# Patient Record
Sex: MALE | Race: BLACK OR AFRICAN AMERICAN | NOT HISPANIC OR LATINO
[De-identification: names, ages, dates, MRNs, and addresses within clinical notes are randomized per-mention and may not be internally consistent; named-entity substitution may affect disease eponyms.]

---

## 2018-06-18 ENCOUNTER — FORM ENCOUNTER (OUTPATIENT)
Age: 53
End: 2018-06-18

## 2018-06-19 ENCOUNTER — OUTPATIENT (OUTPATIENT)
Dept: OUTPATIENT SERVICES | Facility: HOSPITAL | Age: 53
LOS: 1 days | End: 2018-06-19
Payer: COMMERCIAL

## 2018-06-19 ENCOUNTER — APPOINTMENT (OUTPATIENT)
Dept: ORTHOPEDIC SURGERY | Facility: CLINIC | Age: 53
End: 2018-06-19
Payer: COMMERCIAL

## 2018-06-19 DIAGNOSIS — M17.12 UNILATERAL PRIMARY OSTEOARTHRITIS, LEFT KNEE: ICD-10-CM

## 2018-06-19 PROCEDURE — 20610 DRAIN/INJ JOINT/BURSA W/O US: CPT | Mod: LT

## 2018-06-19 PROCEDURE — 73564 X-RAY EXAM KNEE 4 OR MORE: CPT

## 2018-06-19 PROCEDURE — 99213 OFFICE O/P EST LOW 20 MIN: CPT | Mod: 25

## 2018-06-19 PROCEDURE — 73564 X-RAY EXAM KNEE 4 OR MORE: CPT | Mod: 26,50

## 2018-06-19 RX ADMIN — Medication 0 MG/2ML: at 00:00

## 2019-10-16 ENCOUNTER — TRANSCRIPTION ENCOUNTER (OUTPATIENT)
Age: 54
End: 2019-10-16

## 2019-10-16 ENCOUNTER — RESULT REVIEW (OUTPATIENT)
Age: 54
End: 2019-10-16

## 2019-12-03 ENCOUNTER — APPOINTMENT (OUTPATIENT)
Dept: UROLOGY | Facility: CLINIC | Age: 54
End: 2019-12-03

## 2019-12-10 ENCOUNTER — APPOINTMENT (OUTPATIENT)
Dept: UROLOGY | Facility: CLINIC | Age: 54
End: 2019-12-10

## 2023-01-01 ENCOUNTER — NON-APPOINTMENT (OUTPATIENT)
Age: 58
End: 2023-01-01

## 2023-03-28 ENCOUNTER — APPOINTMENT (OUTPATIENT)
Dept: OPHTHALMOLOGY | Facility: CLINIC | Age: 58
End: 2023-03-28

## 2024-07-28 ENCOUNTER — INPATIENT (INPATIENT)
Facility: HOSPITAL | Age: 59
LOS: 1 days | Discharge: HOME | DRG: 312 | End: 2024-07-30
Attending: INTERNAL MEDICINE | Admitting: INTERNAL MEDICINE
Payer: MEDICAID

## 2024-07-28 VITALS
HEART RATE: 84 BPM | OXYGEN SATURATION: 100 % | HEIGHT: 69 IN | DIASTOLIC BLOOD PRESSURE: 61 MMHG | TEMPERATURE: 98 F | RESPIRATION RATE: 16 BRPM | SYSTOLIC BLOOD PRESSURE: 105 MMHG | WEIGHT: 169.98 LBS

## 2024-07-28 DIAGNOSIS — N17.9 ACUTE KIDNEY FAILURE, UNSPECIFIED: ICD-10-CM

## 2024-07-28 DIAGNOSIS — R55 SYNCOPE AND COLLAPSE: ICD-10-CM

## 2024-07-28 DIAGNOSIS — I10 ESSENTIAL (PRIMARY) HYPERTENSION: ICD-10-CM

## 2024-07-28 DIAGNOSIS — E78.5 HYPERLIPIDEMIA, UNSPECIFIED: ICD-10-CM

## 2024-07-28 DIAGNOSIS — Z29.9 ENCOUNTER FOR PROPHYLACTIC MEASURES, UNSPECIFIED: ICD-10-CM

## 2024-07-28 DIAGNOSIS — Z96.652 PRESENCE OF LEFT ARTIFICIAL KNEE JOINT: Chronic | ICD-10-CM

## 2024-07-28 LAB
ADD ON TEST-SPECIMEN IN LAB: SIGNIFICANT CHANGE UP
ALBUMIN SERPL ELPH-MCNC: 5 G/DL — SIGNIFICANT CHANGE UP (ref 3.3–5)
ALP SERPL-CCNC: 49 U/L — SIGNIFICANT CHANGE UP (ref 40–120)
ALT FLD-CCNC: 28 U/L — SIGNIFICANT CHANGE UP (ref 10–45)
ANION GAP SERPL CALC-SCNC: 20 MMOL/L — HIGH (ref 5–17)
APPEARANCE UR: ABNORMAL
APTT BLD: 23.2 SEC — LOW (ref 24.5–35.6)
AST SERPL-CCNC: 29 U/L — SIGNIFICANT CHANGE UP (ref 10–40)
BACTERIA # UR AUTO: NEGATIVE /HPF — SIGNIFICANT CHANGE UP
BASOPHILS # BLD AUTO: 0.03 K/UL — SIGNIFICANT CHANGE UP (ref 0–0.2)
BASOPHILS NFR BLD AUTO: 0.2 % — SIGNIFICANT CHANGE UP (ref 0–2)
BILIRUB SERPL-MCNC: 1 MG/DL — SIGNIFICANT CHANGE UP (ref 0.2–1.2)
BILIRUB UR-MCNC: NEGATIVE — SIGNIFICANT CHANGE UP
BUN SERPL-MCNC: 21 MG/DL — SIGNIFICANT CHANGE UP (ref 7–23)
CALCIUM SERPL-MCNC: 10.6 MG/DL — HIGH (ref 8.4–10.5)
CAST: >63 /LPF — HIGH (ref 0–4)
CHLORIDE SERPL-SCNC: 101 MMOL/L — SIGNIFICANT CHANGE UP (ref 96–108)
CK SERPL-CCNC: 219 U/L — HIGH (ref 30–200)
CO2 SERPL-SCNC: 23 MMOL/L — SIGNIFICANT CHANGE UP (ref 22–31)
COARSE GRAN CASTS #/AREA URNS AUTO: PRESENT
COLOR SPEC: SIGNIFICANT CHANGE UP
CREAT SERPL-MCNC: 1.67 MG/DL — HIGH (ref 0.5–1.3)
D DIMER BLD IA.RAPID-MCNC: 200 NG/ML DDU — SIGNIFICANT CHANGE UP
DIFF PNL FLD: NEGATIVE — SIGNIFICANT CHANGE UP
EGFR: 47 ML/MIN/1.73M2 — LOW
EOSINOPHIL # BLD AUTO: 0.02 K/UL — SIGNIFICANT CHANGE UP (ref 0–0.5)
EOSINOPHIL NFR BLD AUTO: 0.2 % — SIGNIFICANT CHANGE UP (ref 0–6)
FINE GRAN CASTS #/AREA URNS AUTO: PRESENT
GLUCOSE SERPL-MCNC: 97 MG/DL — SIGNIFICANT CHANGE UP (ref 70–99)
GLUCOSE UR QL: NEGATIVE MG/DL — SIGNIFICANT CHANGE UP
HCT VFR BLD CALC: 41.7 % — SIGNIFICANT CHANGE UP (ref 39–50)
HGB BLD-MCNC: 13.5 G/DL — SIGNIFICANT CHANGE UP (ref 13–17)
IMM GRANULOCYTES NFR BLD AUTO: 0.6 % — SIGNIFICANT CHANGE UP (ref 0–0.9)
INR BLD: 1.14 RATIO — SIGNIFICANT CHANGE UP (ref 0.85–1.18)
KETONES UR-MCNC: 15 MG/DL
LEUKOCYTE ESTERASE UR-ACNC: ABNORMAL
LYMPHOCYTES # BLD AUTO: 0.55 K/UL — LOW (ref 1–3.3)
LYMPHOCYTES # BLD AUTO: 4.2 % — LOW (ref 13–44)
MCHC RBC-ENTMCNC: 31.7 PG — SIGNIFICANT CHANGE UP (ref 27–34)
MCHC RBC-ENTMCNC: 32.4 GM/DL — SIGNIFICANT CHANGE UP (ref 32–36)
MCV RBC AUTO: 97.9 FL — SIGNIFICANT CHANGE UP (ref 80–100)
MONOCYTES # BLD AUTO: 0.88 K/UL — SIGNIFICANT CHANGE UP (ref 0–0.9)
MONOCYTES NFR BLD AUTO: 6.7 % — SIGNIFICANT CHANGE UP (ref 2–14)
NEUTROPHILS # BLD AUTO: 11.66 K/UL — HIGH (ref 1.8–7.4)
NEUTROPHILS NFR BLD AUTO: 88.1 % — HIGH (ref 43–77)
NITRITE UR-MCNC: NEGATIVE — SIGNIFICANT CHANGE UP
NRBC # BLD: 0 /100 WBCS — SIGNIFICANT CHANGE UP (ref 0–0)
NT-PROBNP SERPL-SCNC: 132 PG/ML — SIGNIFICANT CHANGE UP (ref 0–300)
PH UR: 5.5 — SIGNIFICANT CHANGE UP (ref 5–8)
PLATELET # BLD AUTO: 214 K/UL — SIGNIFICANT CHANGE UP (ref 150–400)
POTASSIUM SERPL-MCNC: 4.4 MMOL/L — SIGNIFICANT CHANGE UP (ref 3.5–5.3)
POTASSIUM SERPL-SCNC: 4.4 MMOL/L — SIGNIFICANT CHANGE UP (ref 3.5–5.3)
PROT SERPL-MCNC: 7.6 G/DL — SIGNIFICANT CHANGE UP (ref 6–8.3)
PROT UR-MCNC: 100 MG/DL
PROTHROM AB SERPL-ACNC: 11.9 SEC — SIGNIFICANT CHANGE UP (ref 9.5–13)
RBC # BLD: 4.26 M/UL — SIGNIFICANT CHANGE UP (ref 4.2–5.8)
RBC # FLD: 11.9 % — SIGNIFICANT CHANGE UP (ref 10.3–14.5)
RBC CASTS # UR COMP ASSIST: 2 /HPF — SIGNIFICANT CHANGE UP (ref 0–4)
REVIEW: SIGNIFICANT CHANGE UP
SODIUM SERPL-SCNC: 144 MMOL/L — SIGNIFICANT CHANGE UP (ref 135–145)
SP GR SPEC: 1.02 — SIGNIFICANT CHANGE UP (ref 1–1.03)
SQUAMOUS # UR AUTO: 6 /HPF — HIGH (ref 0–5)
TROPONIN T, HIGH SENSITIVITY RESULT: 56 NG/L — HIGH (ref 0–51)
TROPONIN T, HIGH SENSITIVITY RESULT: 60 NG/L — HIGH (ref 0–51)
TROPONIN T, HIGH SENSITIVITY RESULT: 68 NG/L — HIGH (ref 0–51)
UROBILINOGEN FLD QL: 1 MG/DL — SIGNIFICANT CHANGE UP (ref 0.2–1)
WBC # BLD: 13.22 K/UL — HIGH (ref 3.8–10.5)
WBC # FLD AUTO: 13.22 K/UL — HIGH (ref 3.8–10.5)
WBC UR QL: 3 /HPF — SIGNIFICANT CHANGE UP (ref 0–5)

## 2024-07-28 PROCEDURE — 71045 X-RAY EXAM CHEST 1 VIEW: CPT | Mod: 26

## 2024-07-28 PROCEDURE — 99285 EMERGENCY DEPT VISIT HI MDM: CPT

## 2024-07-28 PROCEDURE — 99223 1ST HOSP IP/OBS HIGH 75: CPT

## 2024-07-28 RX ORDER — MAGNESIUM, ALUMINUM HYDROXIDE 200-225/5
30 SUSPENSION, ORAL (FINAL DOSE FORM) ORAL EVERY 4 HOURS
Refills: 0 | Status: DISCONTINUED | OUTPATIENT
Start: 2024-07-28 | End: 2024-07-30

## 2024-07-28 RX ORDER — ONDANSETRON HCL/PF 4 MG/2 ML
4 VIAL (ML) INJECTION EVERY 8 HOURS
Refills: 0 | Status: DISCONTINUED | OUTPATIENT
Start: 2024-07-28 | End: 2024-07-30

## 2024-07-28 RX ORDER — BACTERIOSTATIC SODIUM CHLORIDE 0.9 %
1000 VIAL (ML) INJECTION ONCE
Refills: 0 | Status: COMPLETED | OUTPATIENT
Start: 2024-07-28 | End: 2024-07-28

## 2024-07-28 RX ORDER — ATORVASTATIN CALCIUM 40 MG/1
20 TABLET, FILM COATED ORAL AT BEDTIME
Refills: 0 | Status: DISCONTINUED | OUTPATIENT
Start: 2024-07-28 | End: 2024-07-30

## 2024-07-28 RX ORDER — ACETAMINOPHEN 500 MG
650 TABLET ORAL EVERY 6 HOURS
Refills: 0 | Status: DISCONTINUED | OUTPATIENT
Start: 2024-07-28 | End: 2024-07-30

## 2024-07-28 RX ORDER — MELATONIN 3 MG
3 TABLET ORAL AT BEDTIME
Refills: 0 | Status: DISCONTINUED | OUTPATIENT
Start: 2024-07-28 | End: 2024-07-30

## 2024-07-28 RX ORDER — ATORVASTATIN CALCIUM 40 MG/1
1 TABLET, FILM COATED ORAL
Refills: 0 | DISCHARGE

## 2024-07-28 RX ORDER — AMLODIPINE BESYLATE 2.5 MG/1
5 TABLET ORAL DAILY
Refills: 0 | Status: DISCONTINUED | OUTPATIENT
Start: 2024-07-28 | End: 2024-07-30

## 2024-07-28 RX ORDER — AMLODIPINE BESYLATE/BENAZEPRIL 2.5MG-10MG
1 CAPSULE ORAL
Refills: 0 | DISCHARGE

## 2024-07-28 RX ADMIN — Medication 1000 MILLILITER(S): at 11:26

## 2024-07-28 NOTE — H&P ADULT - NSHPPHYSICALEXAM_GEN_ALL_CORE
Vital Signs Last 24 Hrs  T(C): 36.7 (28 Jul 2024 10:55), Max: 36.7 (28 Jul 2024 10:55)  T(F): 98 (28 Jul 2024 10:55), Max: 98 (28 Jul 2024 10:55)  HR: 82 (28 Jul 2024 10:55) (82 - 84)  BP: 132/75 (28 Jul 2024 10:55) (105/61 - 132/75)  BP(mean): 91 (28 Jul 2024 10:55) (91 - 91)  RR: 19 (28 Jul 2024 10:55) (16 - 19)  SpO2: 98% (28 Jul 2024 10:55) (98% - 100%)    Parameters below as of 28 Jul 2024 10:55  Patient On (Oxygen Delivery Method): room air        CONSTITUTIONAL: Well-groomed, in no apparent distress  NECK: Trachea midline   RESPIRATORY: Breathing comfortably; lungs CTA without wheeze/rhonchi/rales  CARDIOVASCULAR: +S1S2, RRR,  no lower extremity edema  GASTROINTESTINAL: No palpable masses or tenderness, +BS throughout  SKIN: No rashes or ulcers noted  NEUROLOGIC: Sensation intact in LEs b/l to light touch  PSYCHIATRIC: A+O x 3

## 2024-07-28 NOTE — ED PROVIDER NOTE - ATTENDING APP SHARED VISIT CONTRIBUTION OF CARE
I have personally performed a face to face medical and diagnostic evaluation of the patient. I have discussed with and reviewed the Resident's and/or ACP's and/or Medical/PA/NP student's note and agree with the History, ROS, Physical Exam and MDM unless otherwise indicated. A brief summary of my personal evaluation and impression can be found below.     58-year-old male past medical history hyperlipidemia presenting to the emergency department with a syncopal episode that occurred after biking 43 miles this morning, patient states he felt a little off after getting off his bike with some mild shortness of breath and does not remember the passing out episode.  Denies palpitations, dizziness, chest pain prior to episode.  On initial ED arrival, patient states he feels weak.  Otherwise no shortness of breath, chest pain, dizziness, headache.  Patient told he had LVH during a cardiac clearance for knee surgery 10 months ago.  Cardiac history in his family.  No recent travel or prolonged immobilization.  No new calf pain or swelling.  No history of blood clots.  Patient states he was pushing himself a little more than usual during this bike ride today.  Reports some shortness of breath with exertion today but otherwise no chest pain on exertion or shortness of breath with exertion in the past.    GENERAL: Awake. Alert. NAD. Well nourished.  HEENT: NC/AT, PERRL, EOMI, Conjunctiva pink, no scleral icterus. Airway patent. Dry mucous membranes.  LUNGS: CTAB. No wheezes or rales noted.  CARDIAC: Chest non-tender to palpation. RRR.  ABDOMEN: Soft, NT, ND, no rebound, no guarding.  EXT: No edema, no calf tenderness, distal pulses 2+ bilaterally  NEURO: A&Ox3. Moving all extremities. Sensation and strength intact throughout. No focal deficits.  SKIN: Warm and dry.   PSYCH: Normal affect.     Given hx and physical, ddx includes but is not limited to Vasovagal syncope, dehydration, rhabdo, anemia, patient with history of LVH, concern for possible HOCM or cardiogenic origin.   Lower concern for PE (no DVT signs,  no chest pain, patient with transient shortness of breath and mild shortness of breath on exertion today. However pt cannot by excluded for PERC due to age, will plan on obtaining d-dimer). Plan for EKG, x-ray, labs, urine, IV fluids, likely placed in observation unit for echo.    Tatiana Guzman DO (Attending):   EKG showing normal sinus rhythm with LVH, ventricular rate 77, CA interval 162, QRS 84, QTc 457, T wave inversions in lead III, aVF.
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
## 2024-07-28 NOTE — H&P ADULT - PROBLEM SELECTOR PLAN 2
- Pt denies dark urine, muscle aches, though recent significant exertion  - UA showing protein fine and coarse granular casts, ?ATN/Rhabdo  - CPK pending   - Creatinine 1.67, no known history of CKD  - Trend creatinine on BMP   - Received 1L IVF NS in ED, encourage oral intake  - Pt takes benazapril at home- holding   - Renally dose meds

## 2024-07-28 NOTE — H&P ADULT - NSHPLABSRESULTS_GEN_ALL_CORE
13.5   13.22 )-----------( 214      ( 28 Jul 2024 11:06 )             41.7       07-28    144  |  101  |  21  ----------------------------<  97  4.4   |  23  |  1.67<H>    Ca    10.6<H>      28 Jul 2024 11:06    TPro  7.6  /  Alb  5.0  /  TBili  1.0  /  DBili  x   /  AST  29  /  ALT  28  /  AlkPhos  49  07-28      PT/INR - ( 28 Jul 2024 11:55 )   PT: 11.9 sec;   INR: 1.14 ratio         PTT - ( 28 Jul 2024 11:55 )  PTT:23.2 sec    CARDIAC MARKERS ( 28 Jul 2024 11:52 )  x     / x     / 156 U/L / x     / x              Troponin T, High Sensitivity Result: 68 ng/L (07-28-24 @ 11:52)  Troponin T, High Sensitivity Result: 56 ng/L (07-28-24 @ 11:06)

## 2024-07-28 NOTE — PROGRESS NOTE ADULT - SUBJECTIVE AND OBJECTIVE BOX
DATE OF SERVICE: 07-28-24 @ 22:53    Patient is a 58y old  Male who presents with a chief complaint of syncope (28 Jul 2024 15:33)      INTERVAL HISTORY: feels ok    	  MEDICATIONS:  amLODIPine   Tablet 5 milliGRAM(s) Oral daily        PHYSICAL EXAM:  T(C): 37.3 (07-28-24 @ 18:00), Max: 37.3 (07-28-24 @ 18:00)  HR: 63 (07-28-24 @ 18:00) (63 - 84)  BP: 112/70 (07-28-24 @ 18:00) (105/61 - 136/81)  RR: 18 (07-28-24 @ 18:00) (16 - 20)  SpO2: 97% (07-28-24 @ 18:00) (97% - 100%)  Wt(kg): --  I&O's Summary    Height (cm): 175.3 (07-28 @ 10:43)  Weight (kg): 77.1 (07-28 @ 10:43)  BMI (kg/m2): 25.1 (07-28 @ 10:43)  BSA (m2): 1.93 (07-28 @ 10:43)    Appearance: In no distress	  HEENT:    PERRL, EOMI	  Cardiovascular:  S1 S2, No JVD  Respiratory: Lungs clear to auscultation	  Gastrointestinal:  Soft, Non-tender, + BS	  Vascularature:  No edema of LE  Psychiatric: Appropriate affect   Neuro: no acute focal deficits                               13.5   13.22 )-----------( 214      ( 28 Jul 2024 11:06 )             41.7     07-28    144  |  101  |  21  ----------------------------<  97  4.4   |  23  |  1.67<H>    Ca    10.6<H>      28 Jul 2024 11:06    TPro  7.6  /  Alb  5.0  /  TBili  1.0  /  DBili  x   /  AST  29  /  ALT  28  /  AlkPhos  49  07-28        Labs personally reviewed      Assessment:  · Assessment	  58 year old M with PMH of HTN, HLD presenting for episode of syncope. Pt bikes recreationally. Today he biked 40 miles with a biking partner. Pt being admitted for syncope and ILENE. Work up as below      Problem/Plan - 1:  ·  Problem: Syncope.   ·  Plan: - EKG reviewed showing NSR 77 Qtc 457 LVH  - TTE pending  - Nonspecific in setting of mild rhabdo 2/2 biking 43 miles   - Likely 2/2 heat exaustion from long bike ride in hot weather  - but given family hx of premature CVD will need ischemic eval  - Stress test can't be done within 48 hours of peak trop  - CTA cant be done until ILENE resolves  - Will obtain echo and reassess    Problem/Plan - 2:  ·  Problem: HTN (hypertension).   ·  Plan: - Continue with Amlodipine 5 mg qd with parameters  -Holding ACE given ILENE           Kenny Rosales DO [facility]  Cardiovascular Medicine  [address], [address]  Office: [phone]  Available via Text/call on Microsoft Teams
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
## 2024-07-28 NOTE — H&P ADULT - TIME BILLING
- Ordering, reviewing, and interpreting labs, testing, and imaging.  - Independently obtaining a review of systems and performing a physical exam  - Reviewing consultant documentation/recommendations in addition to discussing plan of care with consultants.  - Counselling and educating patient  regarding interpretation of aforementioned items and plan of care.
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
## 2024-07-28 NOTE — H&P ADULT - PROBLEM SELECTOR PLAN 1
- Admit to tele to monitor for ventricular arrythmia   - Cardiac work up done within the past year, girlfriend will bring records   - Orthostatic measurements pending   - EKG reviewed showing NSR 77 Qtc 457 LVH  - TTE pending  - Troponin 56, 68, repeat pending - Likely demand, trend to peak   - Leukocytosis 13.22 on admission, no fevers, will monitor off of abx at this time  - Patient has no urinary symptoms, trace leuk est on UA  - CXR showing no consolidation, has residual dry cough from URI 3 weeks ago  - Cardiology, Dr. Rosales following

## 2024-07-28 NOTE — ED PROVIDER NOTE - PROGRESS NOTE DETAILS
Ambreen Erickson PA-C: results reviewed. patient found to have +delta trop. patient with no chest pain at this time. spoke with Dr. Calloway. will admit to his service. states he will call cardiology. discussed with ED attending. Tatiana Guzman DO (Attending): Agree with above progress note.  Patient with exertional syncope in the setting of LVH on EKG.  Patient with delta Trop 56-68.  Patient without chest pain and currently hemodynamically stable, denies symptoms at this time with the exception of generalized weakness.  Patient to be admitted for further syncope workup.
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
## 2024-07-28 NOTE — H&P ADULT - NSHPREVIEWOFSYSTEMS_GEN_ALL_CORE
Review of Systems:   CONSTITUTIONAL: No fever, weight loss  EYES: No eye pain, visual disturbances, or discharge  ENMT:  No difficulty hearing, tinnitus, vertigo; No sinus or throat pain  RESPIRATORY: No SOB. No wheezing, chills or hemoptysis + cough  CARDIOVASCULAR: No chest pain, palpitations, dizziness, or leg swelling  GASTROINTESTINAL: No abdominal or epigastric pain. No nausea, vomiting, or hematemesis; No diarrhea or constipation. No melena or hematochezia.  GENITOURINARY: No dysuria, frequency, hematuria, or incontinence  NEUROLOGICAL: No headaches, memory loss, loss of strength, numbness, or tremors  SKIN: No itching, burning, rashes, or lesions   MUSCULOSKELETAL: No joint pain or swelling; No muscle, back pain

## 2024-07-28 NOTE — ED ADULT TRIAGE NOTE - PRO INTERPRETER NEED 2
English
Vomiting, Child  Vomiting occurs when stomach contents are thrown up and out of the mouth. Many children notice nausea before vomiting. Vomiting can make your child feel weak and cause dehydration. Dehydration can make your child tired and thirsty, cause your child to have a dry mouth, and decrease how often your child urinates. It is important to treat your child’s vomiting as told by your child’s health care provider.    Follow these instructions at home:  Follow instructions from your child's health care provider about how to care for your child at home.    Eating and drinking     Follow these recommendations as told by your child's health care provider:    Give your child an oral rehydration solution (ORS). This is a drink that is sold at pharmacies and retail stores.  Continue to breastfeed or bottle-feed your young child. Do this frequently, in small amounts. Gradually increase the amount. Do not give your infant extra water.  Encourage your child to eat soft foods in small amounts every 3–4 hours, if your child is eating solid food. Continue your child’s regular diet, but avoid spicy or fatty foods, such as french fries and pizza.  Encourage your child to drink clear fluids, such as water, low-calorie popsicles, and fruit juice that has water added (diluted fruit juice). Have your child drink small amounts of clear fluids slowly. Gradually increase the amount.  Avoid giving your child fluids that contain a lot of sugar or caffeine, such as sports drinks and soda.    General instructions     Make sure that you and your child wash your hands frequently with soap and water. If soap and water are not available, use hand . Make sure that everyone in your child's household washes their hands frequently.  Give over-the-counter and prescription medicines only as told by your child's health care provider.  Watch your child’s condition for any changes.  Keep all follow-up visits as told by your child's health care provider. This is important.  Contact a health care provider if:  Image  Your child has a fever.  Your child will not drink fluids or cannot keep fluids down.  Your child is light-headed or dizzy.  Your child has a headache.  Your child has muscle cramps.  Get help right away if:  You notice signs of dehydration in your child, such as:    No urine in 8–12 hours.  Cracked lips.  Not making tears while crying.  Dry mouth.  Sunken eyes.  Sleepiness.  Weakness.    Your child’s vomiting lasts more than 24 hours.  Your child’s vomit is bright red or looks like black coffee grounds.  Your child has stools that are bloody or black, or stools that look like tar.  Your child has a severe headache, a stiff neck, or both.  Your child has abdominal pain.  Your child has difficulty breathing or is breathing very quickly.  Your child’s heart is beating very quickly.  Your child feels cold and clammy.  Your child seems confused.  You are unable to wake up your child.  Your child has pain while urinating.  This information is not intended to replace advice given to you by your health care provider. Make sure you discuss any questions you have with your health care provider.     Vómitos, Vince  El vómito ocurre cuando el contenido del estómago se expulsa hacia arriba y hacia afuera de la boca. Muchos niños notan náuseas antes de vomitar. Los vómitos pueden hacer que castillo hijo se sienta débil y causar deshidratación. La deshidratación puede hacer que castillo hijo se sienta cansado y tenga sed, que tenga la boca seca y que disminuya la frecuencia con la que orina. Es importante tratar los vómitos de castillo hijo según lo indique el proveedor de atención médica de castillo hijo.    Siga estas instrucciones en casa:  Siga las instrucciones del proveedor de atención médica de castillo hijo sobre cómo cuidar a castillo hijo en casa.    Comiendo y bebiendo    Siga estas recomendaciones según lo indicado por el proveedor de atención médica de castillo hijo:  Wily a castillo hijo cori solución de rehidratación oral (SRO). Esta es cori bebida que se vende en farmacias y tiendas minoristas.  Continúe amamantando o alimentando con biberón a castillo hijo pequeño. Jose Elias esto con frecuencia, en pequeñas cantidades. Aumente gradualmente la cantidad. No le dé a castillo bebé agua adicional.  Anime a castillo hijo a comer alimentos blandos en pequeñas cantidades cada 3 a 4 horas, si está comiendo alimentos sólidos. Continúe con la dieta habitual de castillo hijo, tiffani evite los alimentos picantes o grasosos, ferdinand las pedro pablo fritas y la pizza.  Anime a castillo hijo a beber líquidos kaitlynn, ferdinand agua, paletas heladas bajas en calorías y jugo de frutas con agua añadida (jugo de frutas diluido). Jose Elias que castillo hijo veena lentamente pequeñas cantidades de líquidos kaitlynn. Aumente gradualmente la cantidad.  Evite darle a castillo hijo líquidos que contengan mucha azúcar o cafeína, ferdinand bebidas deportivas y gaseosas.    Instrucciones generales    Asegúrese de que usted y castillo hijo se laven las kj con frecuencia con agua y jabón. Si no hay agua y jabón disponibles, use desinfectante para kj. Asegúrese de que todos en la casa de castillo hijo se laven las kj con frecuencia.  Administre medicamentos recetados y de venta marc solo según lo indique el proveedor de atención médica de castillo hijo.  Vigile la condición de castillo hijo para anthony si hay cambios.  Asista a todas las visitas de seguimiento según lo indique el proveedor de atención médica de castillo hijo. [place] es importante.  Comuníquese con un proveedor de atención médica si:  Imagen  Castillo hijo tiene fiebre.  Castillo hijo no beberá líquidos o no podrá retener los líquidos.  Castillo hijo está mareado o mareado.  Castillo hijo tiene dolor de bryon.  Castillo hijo tiene calambres musculares.  Obtenga ayuda de inmediato si:  Nota signos de deshidratación en castillo hijo, ferdinand:    No orina en 8 a 12 horas.  Labios agrietados.  No hacer lágrimas al llorar.  Boca seca.  Ojos hundidos.  Somnolencia.  Debilidad.    El vómito de castillo hijo dura más de 24 horas.  El vómito de castillo hijo es de color donato brillante o parece café molido.  Castillo hijo tiene heces con phong o negras, o heces que parecen alquitrán.  Castillo hijo tiene un oksana dolor de bryon, rigidez en el nayan o ambos.  Castillo hijo tiene dolor abdominal.  Castillo hijo tiene dificultad para respirar o está respirando muy rápido.  El corazón de castillo hijo está latiendo muy rápido.  Castillo hijo se siente frío y húmedo.  Castillo hijo parece confundido.  No puede despertar a castillo hijo.  Castillo hijo tiene dolor al orinar.  Esta información no pretende reemplazar los consejos que le brinde castillo proveedor de atención médica. Asegúrese de discutir cualquier pregunta que tenga con castillo proveedor de atención médica.

## 2024-07-28 NOTE — ED ADULT NURSE NOTE - NSFALLUNIVINTERV_ED_ALL_ED
Bed/Stretcher in lowest position, wheels locked, appropriate side rails in place/Call bell, personal items and telephone in reach/Instruct patient to call for assistance before getting out of bed/chair/stretcher/Non-slip footwear applied when patient is off stretcher/[place] to call system/Physically safe environment - no spills, clutter or unnecessary equipment/Purposeful proactive rounding/Room/bathroom lighting operational, light cord in reach

## 2024-07-28 NOTE — H&P ADULT - NSICDXFAMILYHX_GEN_ALL_CORE_FT
FAMILY HISTORY:  Father  Still living? Unknown  FH: prostate cancer, Age at diagnosis: Age Unknown    Mother  Still living? Unknown  FH: aortic aneurysm, Age at diagnosis: Age Unknown

## 2024-07-28 NOTE — ED ADULT NURSE REASSESSMENT NOTE - NS ED NURSE REASSESS COMMENT FT1
Confirmed with Christiano in the [facility] room that PT is receiving continuous cardiac monitoring and continuous pulse oximetry monitoring and to notify RN of oxygen saturations of less than 92%.

## 2024-07-29 ENCOUNTER — RESULT REVIEW (OUTPATIENT)
Age: 59
End: 2024-07-29

## 2024-07-29 LAB
ALBUMIN SERPL ELPH-MCNC: 4.5 G/DL — SIGNIFICANT CHANGE UP (ref 3.3–5)
ALP SERPL-CCNC: 42 U/L — SIGNIFICANT CHANGE UP (ref 40–120)
ALT FLD-CCNC: 27 U/L — SIGNIFICANT CHANGE UP (ref 10–45)
ANION GAP SERPL CALC-SCNC: 14 MMOL/L — SIGNIFICANT CHANGE UP (ref 5–17)
AST SERPL-CCNC: 32 U/L — SIGNIFICANT CHANGE UP (ref 10–40)
BASOPHILS # BLD AUTO: 0.01 K/UL — SIGNIFICANT CHANGE UP (ref 0–0.2)
BASOPHILS NFR BLD AUTO: 0.2 % — SIGNIFICANT CHANGE UP (ref 0–2)
BILIRUB SERPL-MCNC: 0.9 MG/DL — SIGNIFICANT CHANGE UP (ref 0.2–1.2)
BUN SERPL-MCNC: 24 MG/DL — HIGH (ref 7–23)
CALCIUM SERPL-MCNC: 9.8 MG/DL — SIGNIFICANT CHANGE UP (ref 8.4–10.5)
CHLORIDE SERPL-SCNC: 104 MMOL/L — SIGNIFICANT CHANGE UP (ref 96–108)
CHOLEST SERPL-MCNC: 214 MG/DL — HIGH
CK SERPL-CCNC: 297 U/L — HIGH (ref 30–200)
CO2 SERPL-SCNC: 26 MMOL/L — SIGNIFICANT CHANGE UP (ref 22–31)
CREAT SERPL-MCNC: 1.24 MG/DL — SIGNIFICANT CHANGE UP (ref 0.5–1.3)
CULTURE RESULTS: SIGNIFICANT CHANGE UP
EGFR: 67 ML/MIN/1.73M2 — SIGNIFICANT CHANGE UP
EOSINOPHIL # BLD AUTO: 0.06 K/UL — SIGNIFICANT CHANGE UP (ref 0–0.5)
EOSINOPHIL NFR BLD AUTO: 1.1 % — SIGNIFICANT CHANGE UP (ref 0–6)
GLUCOSE SERPL-MCNC: 86 MG/DL — SIGNIFICANT CHANGE UP (ref 70–99)
HCT VFR BLD CALC: 38.3 % — LOW (ref 39–50)
HDLC SERPL-MCNC: 95 MG/DL — SIGNIFICANT CHANGE UP
HGB BLD-MCNC: 12.9 G/DL — LOW (ref 13–17)
IMM GRANULOCYTES NFR BLD AUTO: 0.4 % — SIGNIFICANT CHANGE UP (ref 0–0.9)
LIPID PNL WITH DIRECT LDL SERPL: 111 MG/DL — HIGH
LYMPHOCYTES # BLD AUTO: 0.85 K/UL — LOW (ref 1–3.3)
LYMPHOCYTES # BLD AUTO: 16.1 % — SIGNIFICANT CHANGE UP (ref 13–44)
MCHC RBC-ENTMCNC: 32.7 PG — SIGNIFICANT CHANGE UP (ref 27–34)
MCHC RBC-ENTMCNC: 33.7 GM/DL — SIGNIFICANT CHANGE UP (ref 32–36)
MCV RBC AUTO: 97 FL — SIGNIFICANT CHANGE UP (ref 80–100)
MONOCYTES # BLD AUTO: 0.65 K/UL — SIGNIFICANT CHANGE UP (ref 0–0.9)
MONOCYTES NFR BLD AUTO: 12.3 % — SIGNIFICANT CHANGE UP (ref 2–14)
NEUTROPHILS # BLD AUTO: 3.68 K/UL — SIGNIFICANT CHANGE UP (ref 1.8–7.4)
NEUTROPHILS NFR BLD AUTO: 69.9 % — SIGNIFICANT CHANGE UP (ref 43–77)
NON HDL CHOLESTEROL: 119 MG/DL — SIGNIFICANT CHANGE UP
NRBC # BLD: 0 /100 WBCS — SIGNIFICANT CHANGE UP (ref 0–0)
PLATELET # BLD AUTO: 195 K/UL — SIGNIFICANT CHANGE UP (ref 150–400)
POTASSIUM SERPL-MCNC: 4.2 MMOL/L — SIGNIFICANT CHANGE UP (ref 3.5–5.3)
POTASSIUM SERPL-SCNC: 4.2 MMOL/L — SIGNIFICANT CHANGE UP (ref 3.5–5.3)
PROT SERPL-MCNC: 6.9 G/DL — SIGNIFICANT CHANGE UP (ref 6–8.3)
RBC # BLD: 3.95 M/UL — LOW (ref 4.2–5.8)
RBC # FLD: 12 % — SIGNIFICANT CHANGE UP (ref 10.3–14.5)
SODIUM SERPL-SCNC: 144 MMOL/L — SIGNIFICANT CHANGE UP (ref 135–145)
SPECIMEN SOURCE: SIGNIFICANT CHANGE UP
TRIGL SERPL-MCNC: 43 MG/DL — SIGNIFICANT CHANGE UP
WBC # BLD: 5.27 K/UL — SIGNIFICANT CHANGE UP (ref 3.8–10.5)
WBC # FLD AUTO: 5.27 K/UL — SIGNIFICANT CHANGE UP (ref 3.8–10.5)

## 2024-07-29 PROCEDURE — 93306 TTE W/DOPPLER COMPLETE: CPT | Mod: 26

## 2024-07-29 RX ADMIN — AMLODIPINE BESYLATE 5 MILLIGRAM(S): 2.5 TABLET ORAL at 04:57

## 2024-07-29 RX ADMIN — ATORVASTATIN CALCIUM 20 MILLIGRAM(S): 40 TABLET, FILM COATED ORAL at 21:11

## 2024-07-29 NOTE — PATIENT PROFILE ADULT - DO YOU FEEL LIKE HURTING YOURSELF OR OTHERS?
To [facility] with complaints of sore throat, headache, fever and nausea. States it started yesterday. States she does get strep and throat hurts like strep but normally doesn't get nauseated.       Pearl Fontanez RN  10/21/17 [id number] no

## 2024-07-29 NOTE — PROGRESS NOTE ADULT - ASSESSMENT
58 year old M with PMH of HTN, HLD presenting for episode of syncope. Pt bikes recreationally. Today he biked 40 miles with a biking partner. Pt being admitted for syncope and ILENE. Work up as below    Plan:    # Syncope:  - Nonspecific in setting of mild rhabdo 2/2 biking 43 miles   - Likely 2/2 heat exaustion from long bike ride in hot weather  - Trend [employer]  - Fall precautions  - Monitor on tele  - Echo pending  - Eventual Stress and CTA  - Cards following    # ILENE:  Improved  - Monitor I/O's  - Serial Cr  - Avoid nephrotoxins  - Renally dose medications  - Continue to monitor    # HTN/ HLD:  - C/w Cv meds    # GI:  - Bowel regimen prn    DVT ppx:  - IPC's    [employer]  [phone]

## 2024-07-29 NOTE — PROGRESS NOTE ADULT - SUBJECTIVE AND OBJECTIVE BOX
DATE OF SERVICE: 07-29-24 @ 22:45    Patient is a 58y old  Male who presents with a chief complaint of syncope (29 Jul 2024 10:10)      INTERVAL HISTORY: feels ok     	  MEDICATIONS:  amLODIPine   Tablet 5 milliGRAM(s) Oral daily        PHYSICAL EXAM:  T(C): 36.8 (07-29-24 @ 21:17), Max: 37.1 (07-29-24 @ 11:45)  HR: 54 (07-29-24 @ 21:17) (52 - 54)  BP: 124/66 (07-29-24 @ 21:17) (124/66 - 145/79)  RR: 18 (07-29-24 @ 21:17) (18 - 18)  SpO2: 96% (07-29-24 @ 21:17) (96% - 98%)  Wt(kg): --  I&O's Summary        Appearance: In no distress	  HEENT:    PERRL, EOMI	  Cardiovascular:  S1 S2, No JVD  Respiratory: Lungs clear to auscultation	  Gastrointestinal:  Soft, Non-tender, + BS	  Vascularature:  No edema of LE  Psychiatric: Appropriate affect   Neuro: no acute focal deficits                               12.9   5.27  )-----------( 195      ( 29 Jul 2024 07:14 )             38.3     07-29    144  |  104  |  24<H>  ----------------------------<  86  4.2   |  26  |  1.24    Ca    9.8      29 Jul 2024 07:14    TPro  6.9  /  Alb  4.5  /  TBili  0.9  /  DBili  x   /  AST  32  /  ALT  27  /  AlkPhos  42  07-29        Labs personally reviewed      Assessment:  · Assessment	  58 year old M with PMH of HTN, HLD presenting for episode of syncope. Pt bikes recreationally. Today he biked 40 miles with a biking partner. Pt being admitted for syncope and ILENE. Work up as below      Problem/Plan - 1:  ·  Problem: Syncope.   ·  Plan: - EKG reviewed showing NSR 77 Qtc 457 LVH  - Nonspecific in setting of mild rhabdo 2/2 biking 43 miles   - Likely 2/2 heat exaustion from long bike ride in hot weather with evidence of prerenal ILENE on admission   - but given family hx of premature CVD will need ischemic eval  - Stress test can't be done within 48 hours of peak trop  - CTA heart ordered  - echo unremarkable   - Rec OP event monitor to r/o occult arrythmia, discussed with t      OP follow up with [facility] cardiology Clinic [phone]         Kenny Rosales DO [facility]  Cardiovascular Medicine  [address] [place] [address], [address]  Office: [phone]  Available via Text/call on Microsoft Teams

## 2024-07-29 NOTE — PROGRESS NOTE ADULT - SUBJECTIVE AND OBJECTIVE BOX
SUBJECTIVE / OVERNIGHT EVENTS:    Patient seen and examined at bedside. No events noted overnight. Resting comfortably in bed      --------------------------------------------------------------------------------------------  LABS:                        12.9   5.27  )-----------( 195      ( 29 Jul 2024 07:14 )             38.3     07-29    144  |  104  |  24<H>  ----------------------------<  86  4.2   |  26  |  1.24    Ca    9.8      29 Jul 2024 07:14    TPro  6.9  /  Alb  4.5  /  TBili  0.9  /  DBili  x   /  AST  32  /  ALT  27  /  AlkPhos  42  07-29    PT/INR - ( 28 Jul 2024 11:55 )   PT: 11.9 sec;   INR: 1.14 ratio         PTT - ( 28 Jul 2024 11:55 )  PTT:23.2 sec  CAPILLARY BLOOD GLUCOSE        CARDIAC MARKERS ( 29 Jul 2024 07:14 )  x     / x     / 297 U/L / x     / x      CARDIAC MARKERS ( 28 Jul 2024 15:20 )  x     / x     / 219 U/L / x     / x      CARDIAC MARKERS ( 28 Jul 2024 11:52 )  x     / x     / 156 U/L / x     / x          Urinalysis Basic - ( 29 Jul 2024 07:14 )    Color: x / Appearance: x / SG: x / pH: x  Gluc: 86 mg/dL / Ketone: x  / Bili: x / Urobili: x   Blood: x / Protein: x / Nitrite: x   Leuk Esterase: x / RBC: x / WBC x   Sq Epi: x / Non Sq Epi: x / Bacteria: x        RADIOLOGY & ADDITIONAL TESTS: < from: Xray Chest 1 View- PORTABLE-Urgent (07.28.24 @ 11:12) >  IMPRESSION:  Clear lungs.    < end of copied text >      Imaging Personally Reviewed:  [x] YES  [ ] NO    Consultant(s) Notes Reviewed:  [x] YES  [ ] NO    MEDICATIONS  (STANDING):  amLODIPine   Tablet 5 milliGRAM(s) Oral daily  atorvastatin 20 milliGRAM(s) Oral at bedtime    MEDICATIONS  (PRN):  acetaminophen     Tablet .. 650 milliGRAM(s) Oral every 6 hours PRN Temp greater or equal to 38C (100.4F), Mild Pain (1 - 3)  aluminum hydroxide/magnesium hydroxide/simethicone Suspension 30 milliLiter(s) Oral every 4 hours PRN Dyspepsia  melatonin 3 milliGRAM(s) Oral at bedtime PRN Insomnia  ondansetron Injectable 4 milliGRAM(s) IV Push every 8 hours PRN Nausea and/or Vomiting      Care Discussed with Consultants/Other Providers [x] YES  [ ] NO    Vital Signs Last 24 Hrs  T(C): 36.9 (29 Jul 2024 08:02), Max: 37.3 (28 Jul 2024 18:00)  T(F): 98.4 (29 Jul 2024 08:02), Max: 99.2 (28 Jul 2024 18:00)  HR: 53 (29 Jul 2024 08:02) (52 - 84)  BP: 129/78 (29 Jul 2024 08:02) (105/61 - 136/81)  BP(mean): 91 (28 Jul 2024 10:55) (91 - 91)  RR: 18 (29 Jul 2024 08:02) (16 - 20)  SpO2: 98% (29 Jul 2024 08:02) (97% - 100%)    Parameters below as of 29 Jul 2024 08:02  Patient On (Oxygen Delivery Method): room air      I&O's Summary      PHYSICAL EXAM:  GENERAL: NAD, well-developed, comfortable  HEAD:  Atraumatic, Normocephalic  EYES: EOMI, PERRLA, conjunctiva and sclera clear  NECK: Supple, No JVD  CHEST/LUNG: Clear to auscultation bilaterally; No wheeze  HEART: Regular rate and rhythm; No murmurs, rubs, or gallops  ABDOMEN: Soft, Nontender, Nondistended; Bowel sounds present  NEURO: AAOx3, no focal weakness, 5/5 b/l extremity strength, b/l knee no arthritis, no effusion   EXTREMITIES:  2+ Peripheral Pulses, No clubbing, cyanosis, or edema  SKIN: No rashes or lesions

## 2024-07-30 ENCOUNTER — TRANSCRIPTION ENCOUNTER (OUTPATIENT)
Age: 59
End: 2024-07-30

## 2024-07-30 VITALS
HEART RATE: 53 BPM | OXYGEN SATURATION: 98 % | DIASTOLIC BLOOD PRESSURE: 76 MMHG | TEMPERATURE: 98 F | SYSTOLIC BLOOD PRESSURE: 135 MMHG | RESPIRATION RATE: 18 BRPM

## 2024-07-30 PROCEDURE — 75574 CT ANGIO HRT W/3D IMAGE: CPT | Mod: 26

## 2024-07-30 RX ADMIN — AMLODIPINE BESYLATE 5 MILLIGRAM(S): 2.5 TABLET ORAL at 06:32

## 2024-07-30 NOTE — DISCHARGE NOTE PROVIDER - NSDCFUADDAPPT_GEN_ALL_CORE_FT
APPTS ARE READY TO BE MADE: [x ] YES    Best Family or Patient Contact (if needed):    Additional Information about above appointments (if needed):    1: Cardiology clinic  2: Primary Care  3:     Other comments or requests:    APPTS ARE READY TO BE MADE: [x ] YES    Best Family or Patient Contact (if needed):    Additional Information about above appointments (if needed):    1: Cardiology clinic  2: Primary Care  3:     Other comments or requests:   Patient informed us they already have secured a follow up appointment which is not visible on [employer].

## 2024-07-30 NOTE — DISCHARGE NOTE NURSING/CASE MANAGEMENT/SOCIAL WORK - NSDCPEFALRISK_GEN_ALL_CORE
For information on Fall & Injury Prevention, visit: [email or web address] OR  [email or web address] OR  https://www.cdc.gov/steadi/patient.html

## 2024-07-30 NOTE — DISCHARGE NOTE PROVIDER - NSFOLLOWUPCLINICS_GEN_ALL_ED_FT
Cardiology at [facility]  Cardiology  [address] [place] [address]  Phone: [phone]  Fax:   Follow Up Time: 2 weeks    [facility] - Primary Care  Primary Care  [address] [place] [address] [place] [address]  Phone: [phone]  Fax:   Follow Up Time: 1 week

## 2024-07-30 NOTE — DISCHARGE NOTE NURSING/CASE MANAGEMENT/SOCIAL WORK - NSDCFUADDAPPT_GEN_ALL_CORE_FT
APPTS ARE READY TO BE MADE: [x ] YES    Best Family or Patient Contact (if needed):    Additional Information about above appointments (if needed):    1: Cardiology clinic  2: Primary Care  3:     Other comments or requests:

## 2024-07-30 NOTE — PROGRESS NOTE ADULT - SUBJECTIVE AND OBJECTIVE BOX
SUBJECTIVE / OVERNIGHT EVENTS:          --------------------------------------------------------------------------------------------  LABS:                        12.9   5.27  )-----------( 195      ( 29 Jul 2024 07:14 )             38.3     07-29    144  |  104  |  24<H>  ----------------------------<  86  4.2   |  26  |  1.24    Ca    9.8      29 Jul 2024 07:14    TPro  6.9  /  Alb  4.5  /  TBili  0.9  /  DBili  x   /  AST  32  /  ALT  27  /  AlkPhos  42  07-29    PT/INR - ( 28 Jul 2024 11:55 )   PT: 11.9 sec;   INR: 1.14 ratio         PTT - ( 28 Jul 2024 11:55 )  PTT:23.2 sec  CAPILLARY BLOOD GLUCOSE        CARDIAC MARKERS ( 29 Jul 2024 07:14 )  x     / x     / 297 U/L / x     / x      CARDIAC MARKERS ( 28 Jul 2024 15:20 )  x     / x     / 219 U/L / x     / x      CARDIAC MARKERS ( 28 Jul 2024 11:52 )  x     / x     / 156 U/L / x     / x          Urinalysis Basic - ( 29 Jul 2024 07:14 )    Color: x / Appearance: x / SG: x / pH: x  Gluc: 86 mg/dL / Ketone: x  / Bili: x / Urobili: x   Blood: x / Protein: x / Nitrite: x   Leuk Esterase: x / RBC: x / WBC x   Sq Epi: x / Non Sq Epi: x / Bacteria: x        RADIOLOGY & ADDITIONAL TESTS:    Imaging Personally Reviewed:  [x] YES  [ ] NO    Consultant(s) Notes Reviewed:  [x] YES  [ ] NO    MEDICATIONS  (STANDING):  amLODIPine   Tablet 5 milliGRAM(s) Oral daily  atorvastatin 20 milliGRAM(s) Oral at bedtime    MEDICATIONS  (PRN):  acetaminophen     Tablet .. 650 milliGRAM(s) Oral every 6 hours PRN Temp greater or equal to 38C (100.4F), Mild Pain (1 - 3)  aluminum hydroxide/magnesium hydroxide/simethicone Suspension 30 milliLiter(s) Oral every 4 hours PRN Dyspepsia  melatonin 3 milliGRAM(s) Oral at bedtime PRN Insomnia  ondansetron Injectable 4 milliGRAM(s) IV Push every 8 hours PRN Nausea and/or Vomiting      Care Discussed with Consultants/Other Providers [x] YES  [ ] NO    Vital Signs Last 24 Hrs  T(C): 36.7 (30 Jul 2024 04:22), Max: 37.1 (29 Jul 2024 11:45)  T(F): 98 (30 Jul 2024 04:22), Max: 98.7 (29 Jul 2024 11:45)  HR: 53 (30 Jul 2024 04:22) (52 - 54)  BP: 135/76 (30 Jul 2024 04:22) (124/66 - 145/79)  BP(mean): --  RR: 18 (30 Jul 2024 04:22) (18 - 18)  SpO2: 98% (30 Jul 2024 04:22) (96% - 98%)    Parameters below as of 30 Jul 2024 04:22  Patient On (Oxygen Delivery Method): room air      I&O's Summary    PHYSICAL EXAM:  GENERAL: NAD, well-developed, comfortable  HEAD:  Atraumatic, Normocephalic  EYES: EOMI, PERRLA, conjunctiva and sclera clear  NECK: Supple, No JVD  CHEST/LUNG: Clear to auscultation bilaterally; No wheeze  HEART: Regular rate and rhythm; No murmurs, rubs, or gallops  ABDOMEN: Soft, Nontender, Nondistended; Bowel sounds present  NEURO: AAOx3, no focal weakness, 5/5 b/l extremity strength, b/l knee no arthritis, no effusion   EXTREMITIES:  2+ Peripheral Pulses, No clubbing, cyanosis, or edema  SKIN: No rashes or lesions     SUBJECTIVE / OVERNIGHT EVENTS:    patient seen and examined  resting comfortably in bed  no events overnight  CTA pending  --------------------------------------------------------------------------------------------  LABS:                        12.9   5.27  )-----------( 195      ( 29 Jul 2024 07:14 )             38.3     07-29    144  |  104  |  24<H>  ----------------------------<  86  4.2   |  26  |  1.24    Ca    9.8      29 Jul 2024 07:14    TPro  6.9  /  Alb  4.5  /  TBili  0.9  /  DBili  x   /  AST  32  /  ALT  27  /  AlkPhos  42  07-29    PT/INR - ( 28 Jul 2024 11:55 )   PT: 11.9 sec;   INR: 1.14 ratio         PTT - ( 28 Jul 2024 11:55 )  PTT:23.2 sec  CAPILLARY BLOOD GLUCOSE        CARDIAC MARKERS ( 29 Jul 2024 07:14 )  x     / x     / 297 U/L / x     / x      CARDIAC MARKERS ( 28 Jul 2024 15:20 )  x     / x     / 219 U/L / x     / x      CARDIAC MARKERS ( 28 Jul 2024 11:52 )  x     / x     / 156 U/L / x     / x          Urinalysis Basic - ( 29 Jul 2024 07:14 )    Color: x / Appearance: x / SG: x / pH: x  Gluc: 86 mg/dL / Ketone: x  / Bili: x / Urobili: x   Blood: x / Protein: x / Nitrite: x   Leuk Esterase: x / RBC: x / WBC x   Sq Epi: x / Non Sq Epi: x / Bacteria: x        RADIOLOGY & ADDITIONAL TESTS:    Imaging Personally Reviewed:  [x] YES  [ ] NO    Consultant(s) Notes Reviewed:  [x] YES  [ ] NO    MEDICATIONS  (STANDING):  amLODIPine   Tablet 5 milliGRAM(s) Oral daily  atorvastatin 20 milliGRAM(s) Oral at bedtime    MEDICATIONS  (PRN):  acetaminophen     Tablet .. 650 milliGRAM(s) Oral every 6 hours PRN Temp greater or equal to 38C (100.4F), Mild Pain (1 - 3)  aluminum hydroxide/magnesium hydroxide/simethicone Suspension 30 milliLiter(s) Oral every 4 hours PRN Dyspepsia  melatonin 3 milliGRAM(s) Oral at bedtime PRN Insomnia  ondansetron Injectable 4 milliGRAM(s) IV Push every 8 hours PRN Nausea and/or Vomiting      Care Discussed with Consultants/Other Providers [x] YES  [ ] NO    Vital Signs Last 24 Hrs  T(C): 36.7 (30 Jul 2024 04:22), Max: 37.1 (29 Jul 2024 11:45)  T(F): 98 (30 Jul 2024 04:22), Max: 98.7 (29 Jul 2024 11:45)  HR: 53 (30 Jul 2024 04:22) (52 - 54)  BP: 135/76 (30 Jul 2024 04:22) (124/66 - 145/79)  BP(mean): --  RR: 18 (30 Jul 2024 04:22) (18 - 18)  SpO2: 98% (30 Jul 2024 04:22) (96% - 98%)    Parameters below as of 30 Jul 2024 04:22  Patient On (Oxygen Delivery Method): room air      I&O's Summary    PHYSICAL EXAM:  GENERAL: NAD, well-developed, comfortable  HEAD:  Atraumatic, Normocephalic  EYES: EOMI, PERRLA, conjunctiva and sclera clear  NECK: Supple, No JVD  CHEST/LUNG: Clear to auscultation bilaterally; No wheeze  HEART: Regular rate and rhythm; No murmurs, rubs, or gallops  ABDOMEN: Soft, Nontender, Nondistended; Bowel sounds present  NEURO: AAOx3, no focal weakness, 5/5 b/l extremity strength, b/l knee no arthritis, no effusion   EXTREMITIES:  2+ Peripheral Pulses, No clubbing, cyanosis, or edema  SKIN: No rashes or lesions

## 2024-07-30 NOTE — PROGRESS NOTE ADULT - ASSESSMENT
58 year old M with PMH of HTN, HLD presenting for episode of syncope. Pt bikes recreationally. Today he biked 40 miles with a biking partner. Pt being admitted for syncope and ILENE. Work up as below    Plan:    # Syncope:  - Nonspecific in setting of mild rhabdo 2/2 biking 43 miles   - Likely 2/2 heat exhaustion from long bike ride in hot weather  - [facility]  - Fall precautions  - Monitor on tele  - Stress test can't be done within 48 hours of peak trop  - Echo results reviewed  - CTA pending  - Cards following    # ILENE: Improved  - Monitor I/O's  - Serial Cr  - Avoid nephrotoxins  - Renally dose medications  - Continue to monitor    # HTN/ HLD:  - C/w CV meds    # GI:  - Bowel regimen prn    DVT ppx:  - IPC's    [facility]  [phone]   58 year old M with PMH of HTN, HLD presenting for episode of syncope. Pt bikes recreationally. Today he biked 40 miles with a biking partner. Pt being admitted for syncope and ILENE. Work up as below    Plan:    # Syncope:  - Nonspecific in setting of mild rhabdo 2/2 biking 43 miles   - Likely 2/2 heat exhaustion from long bike ride in hot weather  - [facility]  - Fall precautions  - Monitor on tele  - Stress test can't be done within 48 hours of peak trop  - Echo results reviewed  - CTA pending  - Cards following    # ILENE: Improved  - Monitor I/O's  - Serial Cr  - Avoid nephrotoxins  - Renally dose medications  - Continue to monitor    # HTN/ HLD:  - C/w CV meds    # GI:  - Bowel regimen prn    DVT ppx:  - IPC's    [facility]  [phone]

## 2024-07-30 NOTE — DISCHARGE NOTE PROVIDER - NSDCMRMEDTOKEN_GEN_ALL_CORE_FT
amlodipine-benazepril 5 mg-20 mg oral capsule: 1 cap(s) orally once a day  Lipitor 20 mg oral tablet: 1 tab(s) orally once a day (at bedtime)

## 2024-07-30 NOTE — DISCHARGE NOTE PROVIDER - CARE PROVIDER_API CALL
Abdoulaye Morales  [address] [address]  Phone: [phone]  Fax: [phone]  Follow Up Time: 1 week   no gum bleeding/no nose bleeding/no skin lumps

## 2024-07-30 NOTE — DISCHARGE NOTE PROVIDER - NSDCCPCAREPLAN_GEN_ALL_CORE_FT
PRINCIPAL DISCHARGE DIAGNOSIS  Diagnosis: Syncope  Assessment and Plan of Treatment: Ensure that you are drinking adequate water and avoiding exposure to excessive sun and head. You have experienced dehydration and some muscle break down due to your extreme exercise in hot eddie weather with insufficient water intake. Rest and about 2 liters of water daily for the next few days and follow up with the medicine clinic or your primary MD Dr Abdoulaye Morales within 1 week and follow up with the [facility] cardiology clinic within 1-2 weeks.
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
## 2024-07-30 NOTE — DISCHARGE NOTE NURSING/CASE MANAGEMENT/SOCIAL WORK - PATIENT PORTAL LINK FT
You can access the FollowMyHealth Patient Portal offered by [facility] by registering at the following website: [email or web address]. By joining [facility]’s FollowMyHealth portal, you will also be able to view your health information using other applications (apps) compatible with our system.

## 2024-08-22 PROCEDURE — 81001 URINALYSIS AUTO W/SCOPE: CPT

## 2024-08-22 PROCEDURE — 71045 X-RAY EXAM CHEST 1 VIEW: CPT

## 2024-08-22 PROCEDURE — 85379 FIBRIN DEGRADATION QUANT: CPT

## 2024-08-22 PROCEDURE — 93306 TTE W/DOPPLER COMPLETE: CPT

## 2024-08-22 PROCEDURE — 99285 EMERGENCY DEPT VISIT HI MDM: CPT

## 2024-08-22 PROCEDURE — 36415 COLL VENOUS BLD VENIPUNCTURE: CPT

## 2024-08-22 PROCEDURE — 82550 ASSAY OF CK (CPK): CPT

## 2024-08-22 PROCEDURE — 75574 CT ANGIO HRT W/3D IMAGE: CPT | Mod: MC

## 2024-08-22 PROCEDURE — 85610 PROTHROMBIN TIME: CPT

## 2024-08-22 PROCEDURE — 84484 ASSAY OF TROPONIN QUANT: CPT

## 2024-08-22 PROCEDURE — 85730 THROMBOPLASTIN TIME PARTIAL: CPT

## 2024-08-22 PROCEDURE — 85025 COMPLETE CBC W/AUTO DIFF WBC: CPT

## 2024-08-22 PROCEDURE — 83880 ASSAY OF NATRIURETIC PEPTIDE: CPT

## 2024-08-22 PROCEDURE — 80053 COMPREHEN METABOLIC PANEL: CPT

## 2024-08-22 PROCEDURE — 80061 LIPID PANEL: CPT

## 2024-08-22 PROCEDURE — 87086 URINE CULTURE/COLONY COUNT: CPT

## 2024-10-01 PROBLEM — I10 ESSENTIAL (PRIMARY) HYPERTENSION: Chronic | Status: ACTIVE | Noted: 2024-07-28

## 2024-10-01 PROBLEM — E78.5 HYPERLIPIDEMIA, UNSPECIFIED: Chronic | Status: ACTIVE | Noted: 2024-07-28

## 2024-10-02 ENCOUNTER — NON-APPOINTMENT (OUTPATIENT)
Age: 59
End: 2024-10-02

## 2024-10-02 ENCOUNTER — APPOINTMENT (OUTPATIENT)
Dept: CARDIOLOGY | Facility: CLINIC | Age: 59
End: 2024-10-02
Payer: MEDICAID

## 2024-10-02 VITALS
SYSTOLIC BLOOD PRESSURE: 126 MMHG | RESPIRATION RATE: 14 BRPM | DIASTOLIC BLOOD PRESSURE: 76 MMHG | WEIGHT: 170 LBS | HEART RATE: 56 BPM | TEMPERATURE: 97.7 F | HEIGHT: 69 IN | OXYGEN SATURATION: 97 % | BODY MASS INDEX: 25.18 KG/M2

## 2024-10-02 DIAGNOSIS — E78.2 MIXED HYPERLIPIDEMIA: ICD-10-CM

## 2024-10-02 DIAGNOSIS — I10 ESSENTIAL (PRIMARY) HYPERTENSION: ICD-10-CM

## 2024-10-02 DIAGNOSIS — R55 SYNCOPE AND COLLAPSE: ICD-10-CM

## 2024-10-02 PROCEDURE — 99204 OFFICE O/P NEW MOD 45 MIN: CPT | Mod: 25

## 2024-10-02 PROCEDURE — 93000 ELECTROCARDIOGRAM COMPLETE: CPT | Mod: 59
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
## 2024-10-02 NOTE — REVIEW OF SYSTEMS
Assessment/Plan:  Assessment/Plan   Diagnoses and all orders for this visit:    Rotator cuff strain, left, subsequent encounter  -     Ambulatory Referral to Physical Therapy; Future  -     Large joint arthrocentesis: L subacromial bursa    Biceps strain, left, subsequent encounter  -     Ambulatory Referral to Physical Therapy; Future    Subacromial impingement of left shoulder  -     Ambulatory Referral to Physical Therapy; Future    Strain of left pectoralis muscle, subsequent encounter  -     Ambulatory Referral to Physical Therapy; Future        51-year-old right-hand-dominant male with onset of left chest wall and left shoulder pain from injury at work 09/09/2022  Discussed with patient MRI results, impression, plan  MRI of left shoulder noted for small focal full-thickness tear at the anterior supraspinatus without significant retraction or atrophy, subchondral cystic changes greater tuberosity, mild thickening of crackle acromial ligament, glenohumeral joint degenerative changes and chronic degenerative changes of labrum  Left shoulder has range of motion forward flexion to 130, abduction 120, and internal rotation to lumbar spine  Clinical impression is that he is symptomatic from rotator cuff strain/small tear of supraspinatus and aggravation of degenerative changes  I discussed regimen of steroid injection and continuing formal therapy  Surgery is not warranted at this time  I administered mixture of 3 cc 0 25% bupivacaine and 1 cc Kenalog to left shoulder subacromial space without complication  He is to continue with formal therapy and do home exercises as directed  He will follow up in 5 weeks at which point he will be re-evaluated  Subjective:   Patient ID: Josue Moreno is a 52 y o  male    Chief Complaint   Patient presents with   • Left Shoulder - Pain, Follow-up       51-year-old right-hand-dominant male following for onset of left chest wall and left shoulder pain from injury at work 09/09/2022  He was last seen by me 1 month ago at which point he was referred for MRI of left shoulder  He denies any changes symptoms since his last visit  He has pain described as localized to left chest wall and anterior aspect the shoulder, radiating distally along the anterior aspect the upper arm to the elbow, achy and burning and sometimes sharp, worse with moving the arm particularly elevating and lifting, associated with limited range of motion, and improved with resting  Shoulder Pain  This is a new problem  The current episode started more than 1 month ago  The problem occurs daily  The problem has been unchanged  Associated symptoms include arthralgias and weakness  Pertinent negatives include no joint swelling or numbness  Exacerbated by:   Arm use, lifting  He has tried rest ( topical diclofenac, physical therapy, home exercise) for the symptoms  The treatment provided mild relief  Review of Systems   Musculoskeletal: Positive for arthralgias  Negative for joint swelling  Neurological: Positive for weakness  Negative for numbness  Objective:  Vitals:    01/16/23 0813   BP: 129/80   BP Location: Left arm   Patient Position: Sitting   Cuff Size: Standard   Pulse: 76   Weight: 90 3 kg (199 lb)   Height: 5' 11" (1 803 m)     Left Shoulder Exam     Range of Motion   Active abduction: 120   Forward flexion: 130   Internal rotation 0 degrees: Lumbar             Physical Exam  Vitals and nursing note reviewed  Constitutional:       General: He is not in acute distress  Appearance: He is well-developed  He is not ill-appearing or diaphoretic  HENT:      Head: Normocephalic and atraumatic  Right Ear: External ear normal       Left Ear: External ear normal    Eyes:      Conjunctiva/sclera: Conjunctivae normal    Neck:      Trachea: No tracheal deviation  Cardiovascular:      Rate and Rhythm: Normal rate     Pulmonary:      Effort: Pulmonary effort is normal  No respiratory distress  Abdominal:      General: There is no distension  Skin:     General: Skin is warm and dry  Coloration: Skin is not jaundiced or pale  Neurological:      Mental Status: He is alert and oriented to person, place, and time  Psychiatric:         Mood and Affect: Mood normal          Behavior: Behavior normal          Thought Content: Thought content normal          Judgment: Judgment normal            I have personally reviewed pertinent films in PACS and my interpretation is  supraspinatus tear without tendon retraction or atrophy  Large joint arthrocentesis: L subacromial bursa  Universal Protocol:  Consent: Verbal consent obtained  Risks and benefits: risks, benefits and alternatives were discussed  Consent given by: patient  Time out: Immediately prior to procedure a "time out" was called to verify the correct patient, procedure, equipment, support staff and site/side marked as required  Patient understanding: patient states understanding of the procedure being performed  Patient consent: the patient's understanding of the procedure matches consent given  Procedure consent: procedure consent matches procedure scheduled  Relevant documents: relevant documents present and verified  Test results: test results available and properly labeled  Site marked: the operative site was marked  Radiology Images displayed and confirmed   If images not available, report reviewed: imaging studies available  Required items: required blood products, implants, devices, and special equipment available  Patient identity confirmed: verbally with patient    Supporting Documentation  Indications: pain   Procedure Details  Location: shoulder - L subacromial bursa  Preparation: Patient was prepped and draped in the usual sterile fashion  Needle gauge: 21G 2"  Ultrasound guidance: no  Approach: posterolateral  Medications administered: 4 mL bupivacaine 0 25 %; 1 mL bupivacaine 0 25 %; 40 mg triamcinolone acetonide 40 mg/mL    Patient tolerance: patient tolerated the procedure well with no immediate complications  Dressing:  Sterile dressing applied [Negative] : Heme/Lymph
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
## 2024-10-14 ENCOUNTER — TRANSCRIPTION ENCOUNTER (OUTPATIENT)
Age: 59
End: 2024-10-14

## 2024-10-21 ENCOUNTER — APPOINTMENT (OUTPATIENT)
Dept: INTERNAL MEDICINE | Facility: CLINIC | Age: 59
End: 2024-10-21
Payer: MEDICAID

## 2024-10-21 VITALS
TEMPERATURE: 97.2 F | DIASTOLIC BLOOD PRESSURE: 69 MMHG | HEART RATE: 64 BPM | BODY MASS INDEX: 25.18 KG/M2 | HEIGHT: 69 IN | SYSTOLIC BLOOD PRESSURE: 113 MMHG | WEIGHT: 170 LBS

## 2024-10-21 DIAGNOSIS — G25.0 ESSENTIAL TREMOR: ICD-10-CM

## 2024-10-21 DIAGNOSIS — Z00.00 ENCOUNTER FOR GENERAL ADULT MEDICAL EXAMINATION W/OUT ABNORMAL FINDINGS: ICD-10-CM

## 2024-10-21 DIAGNOSIS — Z13.31 ENCOUNTER FOR SCREENING FOR DEPRESSION: ICD-10-CM

## 2024-10-21 DIAGNOSIS — Z12.5 ENCOUNTER FOR SCREENING FOR MALIGNANT NEOPLASM OF PROSTATE: ICD-10-CM

## 2024-10-21 DIAGNOSIS — Z13.228 ENCOUNTER FOR SCREENING FOR OTHER METABOLIC DISORDERS: ICD-10-CM

## 2024-10-21 PROCEDURE — 81003 URINALYSIS AUTO W/O SCOPE: CPT | Mod: QW

## 2024-10-21 PROCEDURE — 99214 OFFICE O/P EST MOD 30 MIN: CPT | Mod: 25

## 2024-10-21 PROCEDURE — 99386 PREV VISIT NEW AGE 40-64: CPT | Mod: 25

## 2024-10-21 PROCEDURE — G0444 DEPRESSION SCREEN ANNUAL: CPT | Mod: 59

## 2024-10-21 RX ORDER — PROPRANOLOL HYDROCHLORIDE 20 MG/1
20 TABLET ORAL TWICE DAILY
Qty: 60 | Refills: 2 | Status: ACTIVE | COMMUNITY
Start: 2024-10-21 | End: 1900-01-01

## 2024-10-22 DIAGNOSIS — Z23 ENCOUNTER FOR IMMUNIZATION: ICD-10-CM

## 2024-10-22 LAB
25(OH)D3 SERPL-MCNC: 27.6 NG/ML
ALBUMIN SERPL ELPH-MCNC: 5 G/DL
ALP BLD-CCNC: 50 U/L
ALT SERPL-CCNC: 23 U/L
ANION GAP SERPL CALC-SCNC: 15 MMOL/L
APPEARANCE: CLEAR
AST SERPL-CCNC: 28 U/L
BACTERIA: NEGATIVE /HPF
BILIRUB SERPL-MCNC: 0.9 MG/DL
BILIRUBIN URINE: NEGATIVE
BLOOD URINE: NEGATIVE
BUN SERPL-MCNC: 20 MG/DL
CALCIUM SERPL-MCNC: 10.6 MG/DL
CAST: 0 /LPF
CHLORIDE SERPL-SCNC: 99 MMOL/L
CHOLEST SERPL-MCNC: 259 MG/DL
CO2 SERPL-SCNC: 26 MMOL/L
COLOR: YELLOW
CREAT SERPL-MCNC: 1.14 MG/DL
EGFR: 74 ML/MIN/1.73M2
EPITHELIAL CELLS: 0 /HPF
ESTIMATED AVERAGE GLUCOSE: 97 MG/DL
FERRITIN SERPL-MCNC: 239 NG/ML
GLUCOSE QUALITATIVE U: NEGATIVE MG/DL
GLUCOSE SERPL-MCNC: 92 MG/DL
HBA1C MFR BLD HPLC: 5 %
HCT VFR BLD CALC: 45.2 %
HCV AB SER QL: NONREACTIVE
HCV S/CO RATIO: 0.07 S/CO
HDLC SERPL-MCNC: 106 MG/DL
HGB BLD-MCNC: 14.9 G/DL
HIV1+2 AB SPEC QL IA.RAPID: NONREACTIVE
IRON SERPL-MCNC: 162 UG/DL
KETONES URINE: ABNORMAL MG/DL
LDLC SERPL CALC-MCNC: 138 MG/DL
LEUKOCYTE ESTERASE URINE: NEGATIVE
MCHC RBC-ENTMCNC: 32.5 PG
MCHC RBC-ENTMCNC: 33 GM/DL
MCV RBC AUTO: 98.5 FL
MICROSCOPIC-UA: NORMAL
NITRITE URINE: NEGATIVE
NONHDLC SERPL-MCNC: 153 MG/DL
PH URINE: 5.5
PLATELET # BLD AUTO: 214 K/UL
POTASSIUM SERPL-SCNC: 5.3 MMOL/L
PROT SERPL-MCNC: 7.7 G/DL
PROTEIN URINE: NORMAL MG/DL
PSA SERPL-MCNC: 0.57 NG/ML
RBC # BLD: 4.59 M/UL
RBC # FLD: 11.9 %
RED BLOOD CELLS URINE: 0 /HPF
SODIUM SERPL-SCNC: 139 MMOL/L
SPECIFIC GRAVITY URINE: 1.03
TRIGL SERPL-MCNC: 91 MG/DL
TSH SERPL-ACNC: 0.72 UIU/ML
UROBILINOGEN URINE: 0.2 MG/DL
VIT B12 SERPL-MCNC: 576 PG/ML
WBC # FLD AUTO: 6.75 K/UL
WHITE BLOOD CELLS URINE: 0 /HPF

## 2024-10-22 RX ORDER — ATORVASTATIN CALCIUM 40 MG/1
40 TABLET, FILM COATED ORAL
Qty: 1 | Refills: 1 | Status: ACTIVE | COMMUNITY
Start: 2024-10-22 | End: 1900-01-01

## 2024-10-23 ENCOUNTER — MED ADMIN CHARGE (OUTPATIENT)
Age: 59
End: 2024-10-23

## 2024-10-23 ENCOUNTER — APPOINTMENT (OUTPATIENT)
Dept: INTERNAL MEDICINE | Facility: CLINIC | Age: 59
End: 2024-10-23
Payer: MEDICAID

## 2024-10-23 PROCEDURE — G0008: CPT

## 2024-10-23 PROCEDURE — 90656 IIV3 VACC NO PRSV 0.5 ML IM: CPT

## 2024-10-23 PROCEDURE — 90472 IMMUNIZATION ADMIN EACH ADD: CPT

## 2024-10-23 PROCEDURE — 90750 HZV VACC RECOMBINANT IM: CPT

## 2024-11-13 ENCOUNTER — TRANSCRIPTION ENCOUNTER (OUTPATIENT)
Age: 59
End: 2024-11-13

## 2024-11-13 DIAGNOSIS — Z12.83 ENCOUNTER FOR SCREENING FOR MALIGNANT NEOPLASM OF SKIN: ICD-10-CM

## 2024-11-20 ENCOUNTER — APPOINTMENT (OUTPATIENT)
Dept: NEUROLOGY | Facility: CLINIC | Age: 59
End: 2024-11-20

## 2024-12-13 ENCOUNTER — NON-APPOINTMENT (OUTPATIENT)
Age: 59
End: 2024-12-13

## 2024-12-13 ENCOUNTER — APPOINTMENT (OUTPATIENT)
Dept: DERMATOLOGY | Facility: CLINIC | Age: 59
End: 2024-12-13
Payer: MEDICAID

## 2024-12-13 VITALS — HEIGHT: 69 IN | WEIGHT: 170 LBS | BODY MASS INDEX: 25.18 KG/M2

## 2024-12-13 DIAGNOSIS — D22.9 MELANOCYTIC NEVI, UNSPECIFIED: ICD-10-CM

## 2024-12-13 DIAGNOSIS — L30.9 DERMATITIS, UNSPECIFIED: ICD-10-CM

## 2024-12-13 PROCEDURE — 99204 OFFICE O/P NEW MOD 45 MIN: CPT

## 2024-12-13 RX ORDER — HYDROCORTISONE 25 MG/G
2.5 OINTMENT TOPICAL
Qty: 1 | Refills: 4 | Status: ACTIVE | COMMUNITY
Start: 2024-12-13 | End: 1900-01-01

## 2025-01-03 ENCOUNTER — TRANSCRIPTION ENCOUNTER (OUTPATIENT)
Age: 60
End: 2025-01-03

## 2025-01-15 ENCOUNTER — NON-APPOINTMENT (OUTPATIENT)
Age: 60
End: 2025-01-15

## 2025-01-29 ENCOUNTER — NON-APPOINTMENT (OUTPATIENT)
Age: 60
End: 2025-01-29

## 2025-01-29 ENCOUNTER — APPOINTMENT (OUTPATIENT)
Dept: INTERNAL MEDICINE | Facility: CLINIC | Age: 60
End: 2025-01-29
Payer: MEDICAID

## 2025-01-29 VITALS
WEIGHT: 173 LBS | TEMPERATURE: 97.1 F | HEIGHT: 69 IN | OXYGEN SATURATION: 99 % | BODY MASS INDEX: 25.62 KG/M2 | DIASTOLIC BLOOD PRESSURE: 63 MMHG | HEART RATE: 57 BPM | SYSTOLIC BLOOD PRESSURE: 110 MMHG

## 2025-01-29 DIAGNOSIS — M67.471 GANGLION, RIGHT ANKLE AND FOOT: ICD-10-CM

## 2025-01-29 DIAGNOSIS — F40.10 SOCIAL PHOBIA, UNSPECIFIED: ICD-10-CM

## 2025-01-29 PROCEDURE — G2211 COMPLEX E/M VISIT ADD ON: CPT | Mod: NC

## 2025-01-29 PROCEDURE — 99214 OFFICE O/P EST MOD 30 MIN: CPT

## 2025-01-29 RX ORDER — VENLAFAXINE HYDROCHLORIDE 37.5 MG/1
37.5 CAPSULE, EXTENDED RELEASE ORAL DAILY
Qty: 90 | Refills: 0 | Status: ACTIVE | COMMUNITY
Start: 2025-01-29 | End: 1900-01-01

## 2025-02-10 ENCOUNTER — TRANSCRIPTION ENCOUNTER (OUTPATIENT)
Age: 60
End: 2025-02-10

## 2025-02-10 ENCOUNTER — APPOINTMENT (OUTPATIENT)
Dept: PODIATRY | Facility: CLINIC | Age: 60
End: 2025-02-10

## 2025-02-10 DIAGNOSIS — B35.3 TINEA PEDIS: ICD-10-CM

## 2025-02-10 DIAGNOSIS — K21.9 GASTRO-ESOPHAGEAL REFLUX DISEASE W/OUT ESOPHAGITIS: ICD-10-CM

## 2025-02-10 PROCEDURE — 17110 DESTRUCTION B9 LES UP TO 14: CPT

## 2025-02-10 PROCEDURE — 99203 OFFICE O/P NEW LOW 30 MIN: CPT | Mod: 25

## 2025-02-10 RX ORDER — HYDROCORTISONE 1 %
12 CREAM (GRAM) TOPICAL
Qty: 1 | Refills: 3 | Status: ACTIVE | COMMUNITY
Start: 2025-02-10 | End: 1900-01-01

## 2025-02-12 ENCOUNTER — TRANSCRIPTION ENCOUNTER (OUTPATIENT)
Age: 60
End: 2025-02-12

## 2025-02-12 RX ORDER — ATORVASTATIN CALCIUM 20 MG/1
20 TABLET, FILM COATED ORAL
Qty: 1 | Refills: 1 | Status: ACTIVE | COMMUNITY
Start: 2025-02-12 | End: 1900-01-01

## 2025-02-13 ENCOUNTER — TRANSCRIPTION ENCOUNTER (OUTPATIENT)
Age: 60
End: 2025-02-13

## 2025-02-20 ENCOUNTER — APPOINTMENT (OUTPATIENT)
Dept: PODIATRY | Facility: CLINIC | Age: 60
End: 2025-02-20

## 2025-02-20 DIAGNOSIS — B07.0 PLANTAR WART: ICD-10-CM

## 2025-02-20 PROCEDURE — 17110 DESTRUCTION B9 LES UP TO 14: CPT | Mod: 58

## 2025-02-21 PROBLEM — B07.0 PLANTAR WART OF RIGHT FOOT: Status: ACTIVE | Noted: 2025-02-12

## 2025-02-26 LAB — CORE LAB BIOPSY: NORMAL

## 2025-03-10 ENCOUNTER — APPOINTMENT (OUTPATIENT)
Dept: INTERNAL MEDICINE | Facility: CLINIC | Age: 60
End: 2025-03-10
Payer: MEDICAID

## 2025-03-10 DIAGNOSIS — M79.2 NEURALGIA AND NEURITIS, UNSPECIFIED: ICD-10-CM

## 2025-03-10 PROCEDURE — 99214 OFFICE O/P EST MOD 30 MIN: CPT | Mod: 95

## 2025-03-10 RX ORDER — GABAPENTIN 300 MG/1
300 CAPSULE ORAL
Qty: 270 | Refills: 0 | Status: ACTIVE | COMMUNITY
Start: 2025-03-10 | End: 1900-01-01

## 2025-03-21 ENCOUNTER — APPOINTMENT (OUTPATIENT)
Dept: NEUROLOGY | Facility: CLINIC | Age: 60
End: 2025-03-21
Payer: MEDICAID

## 2025-03-21 VITALS
SYSTOLIC BLOOD PRESSURE: 126 MMHG | DIASTOLIC BLOOD PRESSURE: 74 MMHG | HEIGHT: 69 IN | BODY MASS INDEX: 25.18 KG/M2 | WEIGHT: 170 LBS | HEART RATE: 59 BPM

## 2025-03-21 DIAGNOSIS — H57.11 OCULAR PAIN, RIGHT EYE: ICD-10-CM

## 2025-03-21 PROCEDURE — 99203 OFFICE O/P NEW LOW 30 MIN: CPT

## 2025-04-07 ENCOUNTER — APPOINTMENT (OUTPATIENT)
Dept: INTERNAL MEDICINE | Facility: CLINIC | Age: 60
End: 2025-04-07
Payer: MEDICAID

## 2025-04-07 VITALS
SYSTOLIC BLOOD PRESSURE: 140 MMHG | DIASTOLIC BLOOD PRESSURE: 76 MMHG | HEART RATE: 59 BPM | TEMPERATURE: 97.9 F | BODY MASS INDEX: 25.18 KG/M2 | OXYGEN SATURATION: 99 % | WEIGHT: 170 LBS | HEIGHT: 69 IN

## 2025-04-07 DIAGNOSIS — E83.52 HYPERCALCEMIA: ICD-10-CM

## 2025-04-07 DIAGNOSIS — E78.00 PURE HYPERCHOLESTEROLEMIA, UNSPECIFIED: ICD-10-CM

## 2025-04-07 PROCEDURE — 99214 OFFICE O/P EST MOD 30 MIN: CPT | Mod: 25

## 2025-04-08 ENCOUNTER — NON-APPOINTMENT (OUTPATIENT)
Age: 60
End: 2025-04-08

## 2025-04-08 LAB
ALBUMIN SERPL ELPH-MCNC: 4.8 G/DL
ALP BLD-CCNC: 57 U/L
ALT SERPL-CCNC: 21 U/L
ANION GAP SERPL CALC-SCNC: 13 MMOL/L
AST SERPL-CCNC: 23 U/L
BILIRUB SERPL-MCNC: 0.7 MG/DL
BUN SERPL-MCNC: 17 MG/DL
CALCIUM SERPL-MCNC: 9.9 MG/DL
CHLORIDE SERPL-SCNC: 103 MMOL/L
CHOLEST SERPL-MCNC: 224 MG/DL
CO2 SERPL-SCNC: 28 MMOL/L
CREAT SERPL-MCNC: 1.03 MG/DL
EGFRCR SERPLBLD CKD-EPI 2021: 84 ML/MIN/1.73M2
GLUCOSE SERPL-MCNC: 98 MG/DL
HDLC SERPL-MCNC: 104 MG/DL
LDLC SERPL-MCNC: 111 MG/DL
NONHDLC SERPL-MCNC: 120 MG/DL
POTASSIUM SERPL-SCNC: 4.6 MMOL/L
PROT SERPL-MCNC: 7.3 G/DL
SODIUM SERPL-SCNC: 143 MMOL/L
TRIGL SERPL-MCNC: 49 MG/DL

## 2025-04-15 ENCOUNTER — TRANSCRIPTION ENCOUNTER (OUTPATIENT)
Age: 60
End: 2025-04-15

## 2025-04-16 ENCOUNTER — TRANSCRIPTION ENCOUNTER (OUTPATIENT)
Age: 60
End: 2025-04-16

## 2025-04-17 ENCOUNTER — APPOINTMENT (OUTPATIENT)
Dept: MRI IMAGING | Facility: CLINIC | Age: 60
End: 2025-04-17

## 2025-04-24 ENCOUNTER — NON-APPOINTMENT (OUTPATIENT)
Age: 60
End: 2025-04-24

## 2025-04-24 ENCOUNTER — APPOINTMENT (OUTPATIENT)
Dept: NEUROLOGY | Age: 60
End: 2025-04-24
Payer: MEDICAID

## 2025-04-24 VITALS
SYSTOLIC BLOOD PRESSURE: 137 MMHG | WEIGHT: 170 LBS | OXYGEN SATURATION: 98 % | HEART RATE: 62 BPM | RESPIRATION RATE: 16 BRPM | DIASTOLIC BLOOD PRESSURE: 72 MMHG | TEMPERATURE: 97.8 F | BODY MASS INDEX: 25.18 KG/M2 | HEIGHT: 69 IN

## 2025-04-24 DIAGNOSIS — G56.20 LESION OF ULNAR NERVE, UNSPECIFIED UPPER LIMB: ICD-10-CM

## 2025-04-24 DIAGNOSIS — R25.1 TREMOR, UNSPECIFIED: ICD-10-CM

## 2025-04-24 DIAGNOSIS — G56.01 CARPAL TUNNEL SYNDROME, RIGHT UPPER LIMB: ICD-10-CM

## 2025-04-24 PROCEDURE — 99214 OFFICE O/P EST MOD 30 MIN: CPT

## 2025-04-29 ENCOUNTER — RX RENEWAL (OUTPATIENT)
Age: 60
End: 2025-04-29

## 2025-04-30 ENCOUNTER — TRANSCRIPTION ENCOUNTER (OUTPATIENT)
Age: 60
End: 2025-04-30

## 2025-05-07 ENCOUNTER — NON-APPOINTMENT (OUTPATIENT)
Age: 60
End: 2025-05-07

## 2025-05-07 ENCOUNTER — RESULT REVIEW (OUTPATIENT)
Age: 60
End: 2025-05-07

## 2025-05-07 DIAGNOSIS — R76.11 NONSPECIFIC REACTION TO TUBERCULIN SKIN TEST W/OUT ACTIVE TUBERCULOSIS: ICD-10-CM

## 2025-05-08 ENCOUNTER — OUTPATIENT (OUTPATIENT)
Dept: OUTPATIENT SERVICES | Facility: HOSPITAL | Age: 60
LOS: 1 days | End: 2025-05-08
Payer: MEDICAID

## 2025-05-08 ENCOUNTER — APPOINTMENT (OUTPATIENT)
Dept: RADIOLOGY | Facility: CLINIC | Age: 60
End: 2025-05-08
Payer: MEDICAID

## 2025-05-08 ENCOUNTER — TRANSCRIPTION ENCOUNTER (OUTPATIENT)
Age: 60
End: 2025-05-08

## 2025-05-08 DIAGNOSIS — Z96.652 PRESENCE OF LEFT ARTIFICIAL KNEE JOINT: Chronic | ICD-10-CM

## 2025-05-08 DIAGNOSIS — R76.11 NONSPECIFIC REACTION TO TUBERCULIN SKIN TEST WITHOUT ACTIVE TUBERCULOSIS: ICD-10-CM

## 2025-05-08 PROCEDURE — 71046 X-RAY EXAM CHEST 2 VIEWS: CPT

## 2025-05-08 PROCEDURE — 71046 X-RAY EXAM CHEST 2 VIEWS: CPT | Mod: 26

## 2025-05-08 PROCEDURE — 71045 X-RAY EXAM CHEST 1 VIEW: CPT

## 2025-05-09 LAB
M TB IFN-G BLD-IMP: NEGATIVE
QUANTIFERON TB PLUS MITOGEN MINUS NIL: >10 IU/ML
QUANTIFERON TB PLUS NIL: 0.02 IU/ML
QUANTIFERON TB PLUS TB1 MINUS NIL: 0.13 IU/ML
QUANTIFERON TB PLUS TB2 MINUS NIL: 0.1 IU/ML

## 2025-05-13 ENCOUNTER — TRANSCRIPTION ENCOUNTER (OUTPATIENT)
Age: 60
End: 2025-05-13

## 2025-06-06 NOTE — ED CLERICAL - DIVISION
Patient called to schedule iron infusions. Scheduled for 6/11 & 6/19. Patient prefers afternoon appts.   [facility]...